# Patient Record
Sex: FEMALE | Race: WHITE | NOT HISPANIC OR LATINO | URBAN - METROPOLITAN AREA
[De-identification: names, ages, dates, MRNs, and addresses within clinical notes are randomized per-mention and may not be internally consistent; named-entity substitution may affect disease eponyms.]

---

## 2021-10-12 ENCOUNTER — IMMUNIZATION (OUTPATIENT)
Dept: HEMATOLOGY/ONCOLOGY | Facility: CLINIC | Age: 86
End: 2021-10-12
Payer: MEDICARE

## 2021-10-12 DIAGNOSIS — Z23 NEED FOR VACCINATION: Primary | ICD-10-CM

## 2021-10-12 PROCEDURE — 0003A COVID-19, MRNA, LNP-S, PF, 30 MCG/0.3 ML DOSE VACCINE: ICD-10-PCS | Mod: S$GLB,,, | Performed by: FAMILY MEDICINE

## 2021-10-12 PROCEDURE — 91300 COVID-19, MRNA, LNP-S, PF, 30 MCG/0.3 ML DOSE VACCINE: ICD-10-PCS | Mod: S$GLB,,, | Performed by: FAMILY MEDICINE

## 2021-10-12 PROCEDURE — 91300 COVID-19, MRNA, LNP-S, PF, 30 MCG/0.3 ML DOSE VACCINE: CPT | Mod: S$GLB,,, | Performed by: FAMILY MEDICINE

## 2021-10-12 PROCEDURE — 0003A COVID-19, MRNA, LNP-S, PF, 30 MCG/0.3 ML DOSE VACCINE: CPT | Mod: S$GLB,,, | Performed by: FAMILY MEDICINE

## 2022-04-22 ENCOUNTER — IMMUNIZATION (OUTPATIENT)
Dept: HEMATOLOGY/ONCOLOGY | Facility: CLINIC | Age: 87
End: 2022-04-22
Payer: MEDICARE

## 2022-04-22 DIAGNOSIS — Z23 NEED FOR VACCINATION: Primary | ICD-10-CM

## 2022-04-22 PROCEDURE — 0054A COVID-19, MRNA, LNP-S, PF, 30 MCG/0.3 ML DOSE VACCINE (PFIZER): CPT | Mod: S$GLB,,, | Performed by: FAMILY MEDICINE

## 2022-04-22 PROCEDURE — 91305 COVID-19, MRNA, LNP-S, PF, 30 MCG/0.3 ML DOSE VACCINE (PFIZER): ICD-10-PCS | Mod: S$GLB,,, | Performed by: FAMILY MEDICINE

## 2022-04-22 PROCEDURE — 91305 COVID-19, MRNA, LNP-S, PF, 30 MCG/0.3 ML DOSE VACCINE (PFIZER): CPT | Mod: S$GLB,,, | Performed by: FAMILY MEDICINE

## 2022-04-22 PROCEDURE — 0054A COVID-19, MRNA, LNP-S, PF, 30 MCG/0.3 ML DOSE VACCINE (PFIZER): ICD-10-PCS | Mod: S$GLB,,, | Performed by: FAMILY MEDICINE

## 2022-07-18 ENCOUNTER — OFFICE VISIT (OUTPATIENT)
Dept: CARDIOTHORACIC SURGERY | Facility: CLINIC | Age: 87
End: 2022-07-18
Payer: MEDICARE

## 2022-07-18 VITALS
OXYGEN SATURATION: 97 % | HEIGHT: 62 IN | BODY MASS INDEX: 27.05 KG/M2 | HEART RATE: 81 BPM | WEIGHT: 147 LBS | DIASTOLIC BLOOD PRESSURE: 97 MMHG | SYSTOLIC BLOOD PRESSURE: 167 MMHG

## 2022-07-18 DIAGNOSIS — I35.0 NONRHEUMATIC AORTIC VALVE STENOSIS: Primary | ICD-10-CM

## 2022-07-18 PROCEDURE — 1159F MED LIST DOCD IN RCRD: CPT | Mod: CPTII,S$GLB,, | Performed by: THORACIC SURGERY (CARDIOTHORACIC VASCULAR SURGERY)

## 2022-07-18 PROCEDURE — 3080F PR MOST RECENT DIASTOLIC BLOOD PRESSURE >= 90 MM HG: ICD-10-PCS | Mod: CPTII,S$GLB,, | Performed by: THORACIC SURGERY (CARDIOTHORACIC VASCULAR SURGERY)

## 2022-07-18 PROCEDURE — 1160F PR REVIEW ALL MEDS BY PRESCRIBER/CLIN PHARMACIST DOCUMENTED: ICD-10-PCS | Mod: CPTII,S$GLB,, | Performed by: THORACIC SURGERY (CARDIOTHORACIC VASCULAR SURGERY)

## 2022-07-18 PROCEDURE — 99205 PR OFFICE/OUTPT VISIT, NEW, LEVL V, 60-74 MIN: ICD-10-PCS | Mod: S$GLB,,, | Performed by: THORACIC SURGERY (CARDIOTHORACIC VASCULAR SURGERY)

## 2022-07-18 PROCEDURE — 3080F DIAST BP >= 90 MM HG: CPT | Mod: CPTII,S$GLB,, | Performed by: THORACIC SURGERY (CARDIOTHORACIC VASCULAR SURGERY)

## 2022-07-18 PROCEDURE — 1159F PR MEDICATION LIST DOCUMENTED IN MEDICAL RECORD: ICD-10-PCS | Mod: CPTII,S$GLB,, | Performed by: THORACIC SURGERY (CARDIOTHORACIC VASCULAR SURGERY)

## 2022-07-18 PROCEDURE — 3077F PR MOST RECENT SYSTOLIC BLOOD PRESSURE >= 140 MM HG: ICD-10-PCS | Mod: CPTII,S$GLB,, | Performed by: THORACIC SURGERY (CARDIOTHORACIC VASCULAR SURGERY)

## 2022-07-18 PROCEDURE — 3077F SYST BP >= 140 MM HG: CPT | Mod: CPTII,S$GLB,, | Performed by: THORACIC SURGERY (CARDIOTHORACIC VASCULAR SURGERY)

## 2022-07-18 PROCEDURE — 99205 OFFICE O/P NEW HI 60 MIN: CPT | Mod: S$GLB,,, | Performed by: THORACIC SURGERY (CARDIOTHORACIC VASCULAR SURGERY)

## 2022-07-18 PROCEDURE — 1160F RVW MEDS BY RX/DR IN RCRD: CPT | Mod: CPTII,S$GLB,, | Performed by: THORACIC SURGERY (CARDIOTHORACIC VASCULAR SURGERY)

## 2022-07-18 RX ORDER — DENOSUMAB 60 MG/ML
60 INJECTION SUBCUTANEOUS
COMMUNITY

## 2022-07-18 RX ORDER — OXYCODONE AND ACETAMINOPHEN 5; 325 MG/1; MG/1
1 TABLET ORAL EVERY 4 HOURS PRN
COMMUNITY
Start: 2022-02-26

## 2022-07-18 RX ORDER — QUINAPRIL 20 MG/1
TABLET ORAL
COMMUNITY
Start: 2022-07-05

## 2022-07-18 RX ORDER — ROSUVASTATIN CALCIUM 20 MG/1
20 TABLET, COATED ORAL DAILY
COMMUNITY
Start: 2022-07-05

## 2022-07-18 RX ORDER — FUROSEMIDE 40 MG/1
TABLET ORAL
COMMUNITY
Start: 2022-04-18

## 2022-07-18 NOTE — PROGRESS NOTES
Subjective:      Patient ID: Patria Castro is a 87 y.o. female who presents for evaluation of aortic stenosis or possible TAVR.     Chief Complaint: Aortic Stenosis (87 year old female presents to clinic for evaluation of TAVR. Past medical history of aortic stenosis, mitral annular calcification, and hyperlipidemia. Reports SOB that has progressively increased in frequency where it is interrupting her activites of daily living.  States she use to be able to walk the mall without stopping , she can no longer do this. )    87 year old female presents to clinic for evaluation of Aortic stenosis for possible   TAVR. Past medical history of aortic stenosis, mitral annular calcification, and hyperlipidemia. Reports SOB that has progressively increased in frequency where it is interrupting her activites of daily living.  States a few months ago she was able to walk the mall for exercise without stopping , she is now having to take breaks due to SOB.  She is in the [place]  [employer] functional class 3-4 In clinic today she does have slight BLE non-pitting edema. Denies history of MI or CVA.  She is a [place] heart [employer] functional class ---III----. Her risk factors include Hypertension, Hyperlipidemia, aortic stenosis, Left carotid bruit,  L mastectomy due to breast cancer, laminectomy and osteoporosis.  No history of TIA or  CVA Her family history is positive for CAD and DM. Denies use of smoking or alcohol. She was evaluated in cardiology and found to have severe aortic stenosis.    Review of Systems   Constitutional: Negative.   HENT: Negative.    Eyes: Negative.    Cardiovascular: Positive for dyspnea on exertion and leg swelling.   Respiratory: Negative.    Endocrine: Negative.    Hematologic/Lymphatic: Negative.    Skin: Negative.    Musculoskeletal: Positive for arthritis, joint pain and stiffness.   Gastrointestinal: Negative.    Genitourinary: Negative.    Neurological: Negative.   "  Psychiatric/Behavioral: Negative.    Allergic/Immunologic: Negative.       Past Medical History:   Diagnosis Date    Hyperlipidemia     Hypertension     Multinodular goiter     Osteoporosis     Right carotid bruit     Stenosis of aortic and mitral valves     Synovial cyst of sacral region       Past Surgical History:   Procedure Laterality Date    APPENDECTOMY      CATARACT EXTRACTION      CHOLECYSTECTOMY      LAMINECTOMY      MASTECTOMY Right     PARTIAL HYSTERECTOMY        Family History   Problem Relation Age of Onset    Anemia Mother     Hyperlipidemia Mother     Hypertension Mother     Breast cancer Mother     Heart disease Brother     Diabetes Brother       Social History     Socioeconomic History    Marital status:    Tobacco Use    Smoking status: Never Smoker   Substance and Sexual Activity    Alcohol use: Never        Medication List with Changes/Refills   Current Medications    DENOSUMAB (PROLIA) 60 MG/ML SYRG    Inject 60 mg into the skin.    FUROSEMIDE (LASIX) 40 MG TABLET        OXYCODONE-ACETAMINOPHEN (PERCOCET) 5-325 MG PER TABLET    Take 1 tablet by mouth every 4 (four) hours as needed. for pain.    QUINAPRIL (ACCUPRIL) 20 MG TABLET        ROSUVASTATIN (CRESTOR) 20 MG TABLET    Take 20 mg by mouth once daily.        Objective:     BP (!) 167/97   Pulse 81   Ht 5' 2" (1.575 m)   Wt 66.7 kg (147 lb)   SpO2 97%   BMI 26.89 kg/m²     Physical Exam  Constitutional:       Appearance: Normal appearance. She is obese.   HENT:      Head: Normocephalic and atraumatic.      Nose: Nose normal.      Mouth/Throat:      Mouth: Mucous membranes are dry.   Eyes:      Extraocular Movements: Extraocular movements intact.      Pupils: Pupils are equal, round, and reactive to light.   Cardiovascular:      Rate and Rhythm: Normal rate and regular rhythm.      Pulses: Normal pulses.      Heart sounds: Murmur heard.   Pulmonary:      Effort: Pulmonary effort is normal.      Breath " sounds: Normal breath sounds.   Abdominal:      General: Abdomen is flat. Bowel sounds are normal.      Palpations: Abdomen is soft.   Musculoskeletal:         General: Normal range of motion.      Cervical back: Normal range of motion and neck supple.   Skin:     General: Skin is warm and dry.   Neurological:      General: No focal deficit present.      Mental Status: She is alert.   Psychiatric:         Mood and Affect: Mood normal.          [facility] case planning STP [facility] 7/12/22: Native valve annulus size ( CT) area : 370.8 mm ( 23 mm with 9 % oversizing ), LVOT area 332.7, Right Iliac femoral artery 7.4 mm, Left iliac femoral artery 6.9 mm    STS Calculated Risk Scores 7/13/22:  Procedure type : Isolated AVR, Risk of mortality 2.376 %    CT Angio Abd and Pelvis 7/12/22: Mild atherosclerotic changes.     EKG 7/12/22: NSR, possible LA enlargement 78 bpm.    PFT 7/12/22: FEV! Was 1.13    CCTA 7/12/22: Left Coronary Height: 11.9, Right Coronary Height: 12.2.  Apparent severe aortic valve stenosis. Nonobstructive Coronary atherlosclerosis limited to the proximal LAD. Normal LV function.     TTE 6/16/22: LV normal in size, EF 72 %. Severe Aortic stenosis present, mild MR present, Mild mitral stenosis. Compared to the previous study, the severity of AS has increased.     Assessment & Plan:     87-year-old woman with known history of aortic stenosis.  She is progressively getting short of breath.  She is in [place] [employer] functional class 3. There is a history of pedal edema.  Recent echocardiogram showed severe calcific aortic stenosis with mean gradient of 43. Peak velocity across the aortic valve was 3.83.  Cardiac CT did not show any significant coronary artery disease.  Her frailty score was 2/4.  Considering her advanced age and high frailty, even though she is a low risk for surgical AVR, were going to discuss in the Heart Team and plan for possible TAVR. .  The risks and benefits of surgery were  explained the patient.  The risks include but not limited to bleeding, infection, need for emergency bypass surgery, stroke, pulmonary failure, renal failure, prolonged hospitalization and death  She understood all risks and wished to proceed with TAVR.  And she wants to have procedure done in couple of weeks time

## 2022-08-09 ENCOUNTER — OUTSIDE PLACE OF SERVICE (OUTPATIENT)
Dept: CARDIOTHORACIC SURGERY | Facility: CLINIC | Age: 87
End: 2022-08-09
Payer: MEDICARE

## 2022-08-09 PROCEDURE — 33361 PR TAVR, PERCUTANEOUS FEMORAL: ICD-10-PCS | Mod: 62,Q0,, | Performed by: THORACIC SURGERY (CARDIOTHORACIC VASCULAR SURGERY)

## 2022-08-09 PROCEDURE — 33361 REPLACE AORTIC VALVE PERQ: CPT | Mod: 62,Q0,, | Performed by: THORACIC SURGERY (CARDIOTHORACIC VASCULAR SURGERY)

## 2022-12-09 ENCOUNTER — OUTSIDE PLACE OF SERVICE (OUTPATIENT)
Dept: CARDIOTHORACIC SURGERY | Facility: CLINIC | Age: 87
End: 2022-12-09
Payer: MEDICARE

## 2022-12-09 PROCEDURE — 33361 PR TAVR, PERCUTANEOUS FEMORAL: ICD-10-PCS | Mod: 62,Q0,, | Performed by: THORACIC SURGERY (CARDIOTHORACIC VASCULAR SURGERY)

## 2022-12-09 PROCEDURE — 33361 REPLACE AORTIC VALVE PERQ: CPT | Mod: 62,Q0,, | Performed by: THORACIC SURGERY (CARDIOTHORACIC VASCULAR SURGERY)

## 2024-12-19 ENCOUNTER — OUTSIDE PLACE OF SERVICE (OUTPATIENT)
Dept: INTERVENTIONAL RADIOLOGY/VASCULAR | Facility: CLINIC | Age: 89
End: 2024-12-19
Payer: MEDICARE